# Patient Record
Sex: FEMALE | Race: WHITE | NOT HISPANIC OR LATINO | Employment: UNEMPLOYED | URBAN - METROPOLITAN AREA
[De-identification: names, ages, dates, MRNs, and addresses within clinical notes are randomized per-mention and may not be internally consistent; named-entity substitution may affect disease eponyms.]

---

## 2017-10-06 ENCOUNTER — APPOINTMENT (OUTPATIENT)
Dept: WOMENS IMAGING | Facility: HOSPITAL | Age: 47
End: 2017-10-06

## 2017-10-06 PROCEDURE — 76641 ULTRASOUND BREAST COMPLETE: CPT | Performed by: RADIOLOGY

## 2017-10-06 PROCEDURE — 77066 DX MAMMO INCL CAD BI: CPT | Performed by: RADIOLOGY

## 2017-10-06 PROCEDURE — 77062 BREAST TOMOSYNTHESIS BI: CPT | Performed by: RADIOLOGY

## 2017-10-18 ENCOUNTER — APPOINTMENT (OUTPATIENT)
Dept: WOMENS IMAGING | Facility: HOSPITAL | Age: 47
End: 2017-10-18

## 2017-10-18 PROCEDURE — 76641 ULTRASOUND BREAST COMPLETE: CPT | Performed by: RADIOLOGY

## 2017-10-18 PROCEDURE — 77061 BREAST TOMOSYNTHESIS UNI: CPT | Performed by: RADIOLOGY

## 2017-10-18 PROCEDURE — 77065 DX MAMMO INCL CAD UNI: CPT | Performed by: RADIOLOGY

## 2018-07-16 ENCOUNTER — LAB (OUTPATIENT)
Dept: LAB | Facility: HOSPITAL | Age: 48
End: 2018-07-16

## 2018-07-16 ENCOUNTER — OFFICE VISIT (OUTPATIENT)
Dept: CARDIOLOGY | Facility: CLINIC | Age: 48
End: 2018-07-16

## 2018-07-16 VITALS
WEIGHT: 110.2 LBS | BODY MASS INDEX: 21.64 KG/M2 | DIASTOLIC BLOOD PRESSURE: 83 MMHG | HEIGHT: 60 IN | SYSTOLIC BLOOD PRESSURE: 120 MMHG | HEART RATE: 80 BPM | OXYGEN SATURATION: 100 %

## 2018-07-16 DIAGNOSIS — Z00.00 HEALTHCARE MAINTENANCE: ICD-10-CM

## 2018-07-16 DIAGNOSIS — R53.81 MALAISE AND FATIGUE: ICD-10-CM

## 2018-07-16 DIAGNOSIS — R00.2 PALPITATIONS: ICD-10-CM

## 2018-07-16 DIAGNOSIS — R53.83 MALAISE AND FATIGUE: ICD-10-CM

## 2018-07-16 DIAGNOSIS — R07.2 PRECORDIAL PAIN: ICD-10-CM

## 2018-07-16 DIAGNOSIS — R06.02 SHORTNESS OF BREATH: ICD-10-CM

## 2018-07-16 DIAGNOSIS — R07.2 PRECORDIAL PAIN: Primary | ICD-10-CM

## 2018-07-16 PROBLEM — E04.9 GOITER: Status: ACTIVE | Noted: 2018-07-16

## 2018-07-16 PROBLEM — J45.909 ASTHMA: Status: ACTIVE | Noted: 2018-07-16

## 2018-07-16 PROBLEM — K44.9 HIATAL HERNIA: Status: ACTIVE | Noted: 2018-07-16

## 2018-07-16 PROCEDURE — 36415 COLL VENOUS BLD VENIPUNCTURE: CPT

## 2018-07-16 PROCEDURE — 84443 ASSAY THYROID STIM HORMONE: CPT | Performed by: NURSE PRACTITIONER

## 2018-07-16 PROCEDURE — 85379 FIBRIN DEGRADATION QUANT: CPT | Performed by: NURSE PRACTITIONER

## 2018-07-16 PROCEDURE — 84484 ASSAY OF TROPONIN QUANT: CPT | Performed by: NURSE PRACTITIONER

## 2018-07-16 PROCEDURE — 82553 CREATINE MB FRACTION: CPT | Performed by: NURSE PRACTITIONER

## 2018-07-16 PROCEDURE — 80061 LIPID PANEL: CPT | Performed by: NURSE PRACTITIONER

## 2018-07-16 PROCEDURE — 85025 COMPLETE CBC W/AUTO DIFF WBC: CPT | Performed by: NURSE PRACTITIONER

## 2018-07-16 PROCEDURE — 80053 COMPREHEN METABOLIC PANEL: CPT | Performed by: NURSE PRACTITIONER

## 2018-07-16 PROCEDURE — 99204 OFFICE O/P NEW MOD 45 MIN: CPT | Performed by: NURSE PRACTITIONER

## 2018-07-16 RX ORDER — NITROGLYCERIN 0.4 MG/1
TABLET SUBLINGUAL
Qty: 30 TABLET | Refills: 3 | Status: SHIPPED | OUTPATIENT
Start: 2018-07-16 | End: 2021-03-24 | Stop reason: SDUPTHER

## 2018-07-16 RX ORDER — RANITIDINE 150 MG/1
150 TABLET ORAL 2 TIMES DAILY
COMMUNITY

## 2018-07-16 NOTE — PROGRESS NOTES
Subjective   Lisa Preciado is a 47 y.o. female     Chief Complaint   Patient presents with   • Establish Care     Presents to establish care.   • Shortness of Breath   • Palpitations       HPI    Problem List:     1.  Chest pain  2.  Palpitations  3.  Shortness of breath  4.  Asthma  5.  Hiatal hernia  6.  Goiter    Patient is a 47-year-old female who presents today to establish care for chest pain.  Patient says for a while now she's been having what feels like midsternum tightness that will radiate up into her neck and left elbow.  She says that she mostly notices around when she eats and she has every couple days.  She says it lasts for a couple hours.  She says for some reason he just feels like she's been holding her breath for a few minutes.  She does have a lot of burping with it.  She will get short of breath, nauseated, she has vomited, she will get lightheaded and fatigued when this occurs.  She says that she will have better 5, skipping and racing when she lies down and she's been having a lot more since she quit smoking.  Patient says she quit back in May.  She did complain of one episode of syncope when she felt like her throat was closing off but denies any dizziness or presyncope.  She denies any orthopnea, PND or edema.  She says that she will get short of breath at rest and again with her chest pain.  She says her fatigue comes and goes at any time.  She has been referred to ENT and pulmonary by her PCP but had missed his appointments due to her daughter having her grandchild.  She has not had a chance to reschedule this.  She used to see Dr. Harding for pulmonary.  She has been taking a lot of Tums but it does not help.    Occupation: Unemployed  She quit smoking May 13 smoked a pack a day for 15 years; denies alcohol and illicit drugs  Drinks 4 cups of coffee per day; one Pepsi per day; nebulized tea    Mom 64 -MI, questionable COPD  Dad 71 alive-healthy  Brother 47 alive-A. Fib  Sister 49  "alive-permanent pacemaker  Paternal grandfather 71 -heart dropsy   Patient possibly had rheumatic fever as a child    Current Outpatient Prescriptions   Medication Sig Dispense Refill   • raNITIdine (ZANTAC) 150 MG tablet Take 150 mg by mouth 2 (Two) Times a Day.     • aspirin 81 MG tablet Take 1 tablet by mouth Daily. 30 tablet 11   • nitroglycerin (NITROSTAT) 0.4 MG SL tablet 1 under the tongue as needed for angina, may repeat q5mins for up three doses 30 tablet 3     No current facility-administered medications for this visit.        ALLERGIES    Codeine; Dilaudid [hydromorphone hcl]; Toradol [ketorolac tromethamine]; and Ultram [tramadol]    Past Medical History:   Diagnosis Date   • GERD (gastroesophageal reflux disease)    • Polycystic kidney disease     Only the Right Kidney       Social History     Social History   • Marital status: Unknown     Spouse name: N/A   • Number of children: N/A   • Years of education: N/A     Occupational History   • Not on file.     Social History Main Topics   • Smoking status: Former Smoker     Packs/day: 1.00     Types: Cigarettes     Quit date: 2018   • Smokeless tobacco: Never Used   • Alcohol use No   • Drug use: No   • Sexual activity: Defer     Other Topics Concern   • Not on file     Social History Narrative   • No narrative on file       Family History   Problem Relation Age of Onset   • Heart attack Mother    • Heart disease Mother    • No Known Problems Father        Review of Systems   Constitutional: Positive for diaphoresis (\"when throat closes\", with CP ) and fatigue (\"comes and goes\"). Negative for chills and fever.   HENT: Positive for postnasal drip, rhinorrhea, sneezing and trouble swallowing (\"feels like throat will close off, choke on pills easily\" Does not see Gastroenterology. ).    Eyes: Negative for visual disturbance.   Respiratory: Positive for shortness of breath (At rest; with CP ).    Cardiovascular: Positive for chest pain (sometimes " "feels like tightening midsternum when sitting still rad to left arm to elbow ;  mostly around when she eats; maybe every couple days or so; lasts couple of hours; feels like holding her breath for a few min; a lot of burping  ) and palpitations (when lying down; racing, butterfly, skipping, since she quit smoking a lot ). Negative for leg swelling.   Gastrointestinal: Positive for nausea (with CP ) and vomiting (with CP ). Negative for abdominal pain, blood in stool, constipation and diarrhea.   Endocrine: Negative.    Genitourinary: Positive for hematuria (History of Kidney Disease). Negative for difficulty urinating.   Musculoskeletal: Negative for arthralgias, back pain, myalgias and neck pain.   Skin: Negative.    Allergic/Immunologic: Positive for environmental allergies.   Neurological: Positive for syncope (One episode- \"felt like my throat was closing off\"), light-headedness (with CP ) and headaches (For the past week on the left side). Negative for dizziness and weakness.   Hematological: Bruises/bleeds easily (Bruise).   Psychiatric/Behavioral: Negative for sleep disturbance (Denies waking at night SOA. ).       Objective   /83 (BP Location: Left arm, Patient Position: Sitting)   Pulse 80   Ht 152.4 cm (60\")   Wt 50 kg (110 lb 3.2 oz)   SpO2 100%   BMI 21.52 kg/m²   Vitals:    07/16/18 1400   BP: 120/83   BP Location: Left arm   Patient Position: Sitting   Pulse: 80   SpO2: 100%   Weight: 50 kg (110 lb 3.2 oz)   Height: 152.4 cm (60\")      Lab Results (most recent)     None        Physical Exam   Constitutional: She is oriented to person, place, and time. Vital signs are normal. She appears well-developed and well-nourished. She is active and cooperative.   HENT:   Head: Normocephalic.   Eyes: Lids are normal.   Neck: Normal carotid pulses, no hepatojugular reflux and no JVD present. Carotid bruit is not present.   Cardiovascular: Normal rate, regular rhythm and normal heart sounds.    Pulses:   "     Radial pulses are 2+ on the right side, and 2+ on the left side.        Dorsalis pedis pulses are 2+ on the right side, and 2+ on the left side.        Posterior tibial pulses are 2+ on the right side, and 2+ on the left side.   No edema BLE.    Pulmonary/Chest: Effort normal and breath sounds normal.   Abdominal: Normal appearance and bowel sounds are normal.   Neurological: She is alert and oriented to person, place, and time.   Skin: Skin is warm, dry and intact.   Psychiatric: She has a normal mood and affect. Her speech is normal and behavior is normal. Judgment and thought content normal. Cognition and memory are normal.       Procedure   Procedures         Assessment/Plan      Diagnosis Plan   1. Precordial pain  Stress Test With Myocardial Perfusion One Day    Adult Transthoracic Echo Complete W/ Cont if Necessary Per Protocol    nitroglycerin (NITROSTAT) 0.4 MG SL tablet    aspirin 81 MG tablet    Comprehensive Metabolic Panel    Troponin    CK-MB    Lipid Panel    D-dimer, Quantitative   2. Shortness of breath  Stress Test With Myocardial Perfusion One Day    Adult Transthoracic Echo Complete W/ Cont if Necessary Per Protocol    Troponin    CK-MB    D-dimer, Quantitative   3. Palpitations  Stress Test With Myocardial Perfusion One Day    Adult Transthoracic Echo Complete W/ Cont if Necessary Per Protocol    Cardiac Event Monitor    TSH   4. Healthcare maintenance  CBC & Differential    Comprehensive Metabolic Panel    TSH    Lipid Panel   5. Malaise and fatigue  CBC & Differential    Comprehensive Metabolic Panel    TSH       Return in about 8 weeks (around 9/10/2018).      chest pain/shortness of breath/palpitations/fatigue-patient will have ischemia workup, stress and echo.  She will wear an event monitor for 2 weeks.  She will get a CK-MB, troponin, d-dimer, CMP, TSH, CBC and lipids.  She will use nitroglycerin when necessary for chest pain no resolution she will go to the ER.  She will start  aspirin 81 mg.  She will continue her medication regimen otherwise.  She'll follow-up in 8 weeks or sooner if any changes.          Patient's Body mass index is 21.52 kg/m². BMI is within normal parameters. No follow-up required.      Electronically signed by:

## 2018-07-16 NOTE — PATIENT INSTRUCTIONS
Palpitations  A palpitation is the feeling that your heartbeat is irregular or is faster than normal. It may feel like your heart is fluttering or skipping a beat. Palpitations are usually not a serious problem. They may be caused by many things, including smoking, caffeine, alcohol, stress, and certain medicines. Although most causes of palpitations are not serious, palpitations can be a sign of a serious medical problem. In some cases, you may need further medical evaluation.  Follow these instructions at home:  Pay attention to any changes in your symptoms. Take these actions to help with your condition:  · Avoid the following:  ? Caffeinated coffee, tea, soft drinks, diet pills, and energy drinks.  ? Chocolate.  ? Alcohol.  · Do not use any tobacco products, such as cigarettes, chewing tobacco, and e-cigarettes. If you need help quitting, ask your health care provider.  · Try to reduce your stress and anxiety. Things that can help you relax include:  ? Yoga.  ? Meditation.  ? Physical activity, such as swimming, jogging, or walking.  ? Biofeedback. This is a method that helps you learn to use your mind to control things in your body, such as your heartbeats.  · Get plenty of rest and sleep.  · Take over-the-counter and prescription medicines only as told by your health care provider.  · Keep all follow-up visits as told by your health care provider. This is important.    Contact a health care provider if:  · You continue to have a fast or irregular heartbeat after 24 hours.  · Your palpitations occur more often.  Get help right away if:  · You have chest pain or shortness of breath.  · You have a severe headache.  · You feel dizzy or you faint.  This information is not intended to replace advice given to you by your health care provider. Make sure you discuss any questions you have with your health care provider.  Document Released: 12/15/2001 Document Revised: 05/22/2017 Document Reviewed: 09/01/2016  [employer]  Interactive Patient Education © 2018 [employer]    Nonspecific Chest Pain  Chest pain can be caused by many different conditions. There is always a chance that your pain could be related to something serious, such as a heart attack or a blood clot in your lungs. Chest pain can also be caused by conditions that are not life-threatening. If you have chest pain, it is very important to follow up with your health care provider.  What are the causes?  Causes of this condition include:  · Heartburn.  · Pneumonia or bronchitis.  · Anxiety or stress.  · Inflammation around your heart (pericarditis) or lung (pleuritis or pleurisy).  · A blood clot in your lung.  · A collapsed lung (pneumothorax). This can develop suddenly on its own (spontaneous pneumothorax) or from trauma to the chest.  · Shingles infection (varicella-zoster virus).  · Heart attack.  · Damage to the bones, muscles, and cartilage that make up your chest wall. This can include:  ? Bruised bones due to injury.  ? Strained muscles or cartilage due to frequent or repeated coughing or overwork.  ? Fracture to one or more ribs.  ? Sore cartilage due to inflammation (costochondritis).    What increases the risk?  Risk factors for this condition may include:  · Activities that increase your risk for trauma or injury to your chest.  · Respiratory infections or conditions that cause frequent coughing.  · Medical conditions or overeating that can cause heartburn.  · Heart disease or family history of heart disease.  · Conditions or health behaviors that increase your risk of developing a blood clot.  · Having had chicken pox (varicella zoster).    What are the signs or symptoms?  Chest pain can feel like:  · Burning or tingling on the surface of your chest or deep in your chest.  · Crushing, pressure, aching, or squeezing pain.  · Dull or sharp pain that is worse when you move, cough, or take a deep breath.  · Pain that is also felt in your back, neck, shoulder, or  arm, or pain that spreads to any of these areas.    Your chest pain may come and go, or it may stay constant.  How is this diagnosed?  Lab tests or other studies may be needed to find the cause of your pain. Your health care provider may have you take a test called an ECG (electrocardiogram). An ECG records your heartbeat patterns at the time the test is performed. You may also have other tests, such as:  · Transthoracic echocardiogram (TTE). In this test, sound waves are used to create a picture of the heart structures and to look at how blood flows through your heart.  · Transesophageal echocardiogram (SHERLEY). This is a more advanced imaging test that takes images from inside your body. It allows your health care provider to see your heart in finer detail.  · Cardiac monitoring. This allows your health care provider to monitor your heart rate and rhythm in real time.  · Holter monitor. This is a portable device that records your heartbeat and can help to diagnose abnormal heartbeats. It allows your health care provider to track your heart activity for several days, if needed.  · Stress tests. These can be done through exercise or by taking medicine that makes your heart beat more quickly.  · Blood tests.  · Other imaging tests.    How is this treated?  Treatment depends on what is causing your chest pain. Treatment may include:  · Medicines. These may include:  ? Acid blockers for heartburn.  ? Anti-inflammatory medicine.  ? Pain medicine for inflammatory conditions.  ? Antibiotic medicine, if an infection is present.  ? Medicines to dissolve blood clots.  ? Medicines to treat coronary artery disease (CAD).  · Supportive care for conditions that do not require medicines. This may include:  ? Resting.  ? Applying heat or cold packs to injured areas.  ? Limiting activities until pain decreases.    Follow these instructions at home:  Medicines  · If you were prescribed an antibiotic, take it as told by your health care  provider. Do not stop taking the antibiotic even if you start to feel better.  · Take over-the-counter and prescription medicines only as told by your health care provider.  Lifestyle  · Do not use any products that contain nicotine or tobacco, such as cigarettes and e-cigarettes. If you need help quitting, ask your health care provider.  · Do not drink alcohol.  · Make lifestyle changes as directed by your health care provider. These may include:  ? Getting regular exercise. Ask your health care provider to suggest some activities that are safe for you.  ? Eating a heart-healthy diet. A registered dietitian can help you to learn healthy eating options.  ? Maintaining a healthy weight.  ? Managing diabetes, if necessary.  ? Reducing stress, such as with yoga or relaxation techniques.  General instructions  · Avoid any activities that bring on chest pain.  · If heartburn is the cause for your chest pain, raise (elevate) the head of your bed about 6 inches (15 cm) by putting blocks under the legs. Sleeping with more pillows does not effectively relieve heartburn because it only changes the position of your head.  · Keep all follow-up visits as told by your health care provider. This is important. This includes any further testing if your chest pain does not go away.  Contact a health care provider if:  · Your chest pain does not go away.  · You have a rash with blisters on your chest.  · You have a fever.  · You have chills.  Get help right away if:  · Your chest pain is worse.  · You have a cough that gets worse, or you cough up blood.  · You have severe pain in your abdomen.  · You have severe weakness.  · You faint.  · You have sudden, unexplained chest discomfort.  · You have sudden, unexplained discomfort in your arms, back, neck, or jaw.  · You have shortness of breath at any time.  · You suddenly start to sweat, or your skin gets clammy.  · You feel nauseous or you vomit.  · You suddenly feel light-headed or  dizzy.  · Your heart begins to beat quickly, or it feels like it is skipping beats.  These symptoms may represent a serious problem that is an emergency. Do not wait to see if the symptoms will go away. Get medical help right away. Call your local emergency services (911 in the [place] Do not drive yourself to the hospital.  This information is not intended to replace advice given to you by your health care provider. Make sure you discuss any questions you have with your health care provider.  Document Released: 09/27/2006 Document Revised: 09/11/2017 Document Reviewed: 09/11/2017  [employer] Interactive Patient Education © 2017 [employer]

## 2018-07-17 LAB
ALBUMIN SERPL-MCNC: 4.6 G/DL (ref 3.5–5)
ALBUMIN/GLOB SERPL: 1.4 G/DL (ref 1.5–2.5)
ALP SERPL-CCNC: 62 U/L (ref 35–104)
ALT SERPL W P-5'-P-CCNC: 19 U/L (ref 10–36)
ANION GAP SERPL CALCULATED.3IONS-SCNC: 8.7 MMOL/L (ref 3.6–11.2)
AST SERPL-CCNC: 27 U/L (ref 10–30)
BASOPHILS # BLD AUTO: 0.05 10*3/MM3 (ref 0–0.3)
BASOPHILS NFR BLD AUTO: 0.8 % (ref 0–2)
BILIRUB SERPL-MCNC: 0.7 MG/DL (ref 0.2–1.8)
BUN BLD-MCNC: 12 MG/DL (ref 7–21)
BUN/CREAT SERPL: 15.2 (ref 7–25)
CALCIUM SPEC-SCNC: 9.3 MG/DL (ref 7.7–10)
CHLORIDE SERPL-SCNC: 109 MMOL/L (ref 99–112)
CHOLEST SERPL-MCNC: 173 MG/DL (ref 0–200)
CK MB SERPL-CCNC: 0.61 NG/ML (ref 0–5)
CO2 SERPL-SCNC: 23.3 MMOL/L (ref 24.3–31.9)
CREAT BLD-MCNC: 0.79 MG/DL (ref 0.43–1.29)
D DIMER PPP FEU-MCNC: <0.27 MCGFEU/ML (ref 0–0.5)
DEPRECATED RDW RBC AUTO: 44.9 FL (ref 37–54)
EOSINOPHIL # BLD AUTO: 0.21 10*3/MM3 (ref 0–0.7)
EOSINOPHIL NFR BLD AUTO: 3.3 % (ref 0–5)
ERYTHROCYTE [DISTWIDTH] IN BLOOD BY AUTOMATED COUNT: 13.5 % (ref 11.5–14.5)
GFR SERPL CREATININE-BSD FRML MDRD: 79 ML/MIN/1.73
GLOBULIN UR ELPH-MCNC: 3.3 GM/DL
GLUCOSE BLD-MCNC: 94 MG/DL (ref 70–110)
HCT VFR BLD AUTO: 41.6 % (ref 37–47)
HDLC SERPL-MCNC: 78 MG/DL (ref 60–100)
HGB BLD-MCNC: 14.3 G/DL (ref 12–16)
IMM GRANULOCYTES # BLD: 0.06 10*3/MM3 (ref 0–0.03)
IMM GRANULOCYTES NFR BLD: 1 % (ref 0–0.5)
LDLC SERPL CALC-MCNC: 85 MG/DL (ref 0–100)
LDLC/HDLC SERPL: 1.09 {RATIO}
LYMPHOCYTES # BLD AUTO: 2.48 10*3/MM3 (ref 1–3)
LYMPHOCYTES NFR BLD AUTO: 39.3 % (ref 21–51)
MCH RBC QN AUTO: 32.5 PG (ref 27–33)
MCHC RBC AUTO-ENTMCNC: 34.4 G/DL (ref 33–37)
MCV RBC AUTO: 94.5 FL (ref 80–94)
MONOCYTES # BLD AUTO: 0.48 10*3/MM3 (ref 0.1–0.9)
MONOCYTES NFR BLD AUTO: 7.6 % (ref 0–10)
NEUTROPHILS # BLD AUTO: 3.03 10*3/MM3 (ref 1.4–6.5)
NEUTROPHILS NFR BLD AUTO: 48 % (ref 30–70)
OSMOLALITY SERPL CALC.SUM OF ELEC: 280.8 MOSM/KG (ref 273–305)
PLATELET # BLD AUTO: 225 10*3/MM3 (ref 130–400)
PMV BLD AUTO: 11.1 FL (ref 6–10)
POTASSIUM BLD-SCNC: 4.1 MMOL/L (ref 3.5–5.3)
PROT SERPL-MCNC: 7.9 G/DL (ref 6–8)
RBC # BLD AUTO: 4.4 10*6/MM3 (ref 4.2–5.4)
SODIUM BLD-SCNC: 141 MMOL/L (ref 135–153)
TRIGL SERPL-MCNC: 50 MG/DL (ref 0–150)
TROPONIN I SERPL-MCNC: <0.006 NG/ML
TSH SERPL DL<=0.05 MIU/L-ACNC: 2.22 MIU/ML (ref 0.55–4.78)
VLDLC SERPL-MCNC: 10 MG/DL
WBC NRBC COR # BLD: 6.31 10*3/MM3 (ref 4.5–12.5)

## 2018-08-08 ENCOUNTER — OUTSIDE FACILITY SERVICE (OUTPATIENT)
Dept: CARDIOLOGY | Facility: CLINIC | Age: 48
End: 2018-08-08

## 2018-08-08 ENCOUNTER — HOSPITAL ENCOUNTER (OUTPATIENT)
Dept: CARDIOLOGY | Facility: HOSPITAL | Age: 48
Discharge: HOME | End: 2018-08-08

## 2018-08-08 DIAGNOSIS — R07.2 PRECORDIAL PAIN: ICD-10-CM

## 2018-08-08 DIAGNOSIS — R00.2 PALPITATIONS: ICD-10-CM

## 2018-08-08 DIAGNOSIS — R06.02 SHORTNESS OF BREATH: ICD-10-CM

## 2018-08-08 LAB
MAXIMAL PREDICTED HEART RATE: 175 BPM
MAXIMAL PREDICTED HEART RATE: 175 BPM
STRESS TARGET HR: 149 BPM
STRESS TARGET HR: 149 BPM

## 2018-08-08 PROCEDURE — 93306 TTE W/DOPPLER COMPLETE: CPT | Performed by: INTERNAL MEDICINE

## 2018-08-08 PROCEDURE — 25010000002 REGADENOSON 0.4 MG/5ML SOLUTION: Performed by: INTERNAL MEDICINE

## 2018-08-08 PROCEDURE — 93017 CV STRESS TEST TRACING ONLY: CPT

## 2018-08-08 PROCEDURE — 78452 HT MUSCLE IMAGE SPECT MULT: CPT | Performed by: INTERNAL MEDICINE

## 2018-08-08 PROCEDURE — 78452 HT MUSCLE IMAGE SPECT MULT: CPT

## 2018-08-08 PROCEDURE — 0 TECHNETIUM SESTAMIBI: Performed by: INTERNAL MEDICINE

## 2018-08-08 PROCEDURE — A9500 TC99M SESTAMIBI: HCPCS | Performed by: INTERNAL MEDICINE

## 2018-08-08 PROCEDURE — 93018 CV STRESS TEST I&R ONLY: CPT | Performed by: INTERNAL MEDICINE

## 2018-08-08 PROCEDURE — 93306 TTE W/DOPPLER COMPLETE: CPT

## 2018-08-08 RX ADMIN — TECHNETIUM TC 99M SESTAMIBI 1 DOSE: 1 INJECTION INTRAVENOUS at 07:57

## 2018-08-08 RX ADMIN — REGADENOSON 0.4 MG: 0.08 INJECTION, SOLUTION INTRAVENOUS at 07:56

## 2018-08-08 RX ADMIN — TECHNETIUM TC 99M SESTAMIBI 1 DOSE: 1 INJECTION INTRAVENOUS at 07:56

## 2018-08-09 ENCOUNTER — TELEPHONE (OUTPATIENT)
Dept: CARDIOLOGY | Facility: CLINIC | Age: 48
End: 2018-08-09

## 2018-08-13 ENCOUNTER — LAB (OUTPATIENT)
Dept: LAB | Facility: HOSPITAL | Age: 48
End: 2018-08-13

## 2018-08-13 ENCOUNTER — OFFICE VISIT (OUTPATIENT)
Dept: CARDIOLOGY | Facility: CLINIC | Age: 48
End: 2018-08-13

## 2018-08-13 VITALS
HEIGHT: 60 IN | BODY MASS INDEX: 22.07 KG/M2 | OXYGEN SATURATION: 100 % | DIASTOLIC BLOOD PRESSURE: 75 MMHG | HEART RATE: 66 BPM | SYSTOLIC BLOOD PRESSURE: 132 MMHG | WEIGHT: 112.4 LBS

## 2018-08-13 DIAGNOSIS — R94.39 ABNORMAL STRESS TEST: ICD-10-CM

## 2018-08-13 DIAGNOSIS — R00.2 PALPITATIONS: ICD-10-CM

## 2018-08-13 DIAGNOSIS — R06.02 SHORTNESS OF BREATH: ICD-10-CM

## 2018-08-13 DIAGNOSIS — R07.2 PRECORDIAL PAIN: Primary | ICD-10-CM

## 2018-08-13 DIAGNOSIS — Z00.00 HEALTHCARE MAINTENANCE: ICD-10-CM

## 2018-08-13 DIAGNOSIS — R07.2 PRECORDIAL PAIN: ICD-10-CM

## 2018-08-13 PROCEDURE — 36415 COLL VENOUS BLD VENIPUNCTURE: CPT

## 2018-08-13 PROCEDURE — 80048 BASIC METABOLIC PNL TOTAL CA: CPT | Performed by: NURSE PRACTITIONER

## 2018-08-13 PROCEDURE — 99214 OFFICE O/P EST MOD 30 MIN: CPT | Performed by: NURSE PRACTITIONER

## 2018-08-13 RX ORDER — ATORVASTATIN CALCIUM 20 MG/1
20 TABLET, FILM COATED ORAL DAILY
Qty: 30 TABLET | Refills: 11 | Status: SHIPPED | OUTPATIENT
Start: 2018-08-13

## 2018-08-13 RX ORDER — CLOPIDOGREL BISULFATE 75 MG/1
75 TABLET ORAL DAILY
Qty: 30 TABLET | Refills: 11 | Status: SHIPPED | OUTPATIENT
Start: 2018-08-13

## 2018-08-13 RX ORDER — ISOSORBIDE MONONITRATE 30 MG/1
TABLET, EXTENDED RELEASE ORAL
Qty: 30 TABLET | Refills: 11 | Status: SHIPPED | OUTPATIENT
Start: 2018-08-13 | End: 2021-03-24 | Stop reason: SDUPTHER

## 2018-08-13 RX ORDER — ISOSORBIDE MONONITRATE 30 MG/1
30 TABLET, EXTENDED RELEASE ORAL EVERY MORNING
Qty: 30 TABLET | Refills: 11 | Status: SHIPPED | OUTPATIENT
Start: 2018-08-13 | End: 2018-08-13 | Stop reason: SDUPTHER

## 2018-08-13 NOTE — PROGRESS NOTES
Subjective   Lisa Preciado is a 47 y.o. female     Chief Complaint   Patient presents with   • Palpitations     PRESENTS AS A FOLLOW UP       HPI    Problem List:     1.  Chest pain  1.1 stress test 8/8/18-anterior, anteroseptal and septal ischemia, preserved LVEF, mild hypokinesis and moderate hypokinesis  2.  Palpitations  3.  Shortness of breath  3.1 Echo 8/8/18-EF 55-60%, diastolic dysfunction 1, trace TR, trace AZ  4.  Asthma  5.  Hiatal hernia  6.  Goiter    Patient is a 47-year-old female who presents today with her significant other for follow-up on testing.  He continues to have left anterior pressure/squeezing which she describes as a elephant.  She says she will then have sharp pain under her left arm.  She says that she will have it mostly at rest.  She says that she has taken up to 3 nitroglycerin during an episode.  She says she has what feels like heartburn that goes up into her throat and down into her chest however and asses.work only nitroglycerin relieves the pain.  He will get diaphoretic, fatigued, nauseated, lightheaded and short of breath when this occurs.  She said after 8/7 she hadn't had it for a while until about 3 days ago and then it came back.  She says at times she will have palpitations with her chest pain and other times it without it.  She says sometimes it feels like is beating really fast and it feels like it stops that she will get real sweaty with this.  She says she constantly feels like she is dizzy she says it doesn't feel like it ever goes away, like she's taking the Benadryl and you have the after effects.  She denies any presyncope, syncope, orthopnea, PND or edema.  She says when she gets going she is fine she don't have any problems breathing except when she first starts out her day.    We went over her stress, echo and labs.    Current Outpatient Prescriptions   Medication Sig Dispense Refill   • aspirin 81 MG tablet Take 1 tablet by mouth Daily. 30 tablet 11   •  nitroglycerin (NITROSTAT) 0.4 MG SL tablet 1 under the tongue as needed for angina, may repeat q5mins for up three doses 30 tablet 3   • raNITIdine (ZANTAC) 150 MG tablet Take 150 mg by mouth 2 (Two) Times a Day.     • atorvastatin (LIPITOR) 20 MG tablet Take 1 tablet by mouth Daily. 30 tablet 11   • clopidogrel (PLAVIX) 75 MG tablet Take 1 tablet by mouth Daily. 30 tablet 11   • isosorbide mononitrate (IMDUR) 30 MG 24 hr tablet TAKE 1/2 TABLET BY MOUTH DAILY 30 tablet 11     No current facility-administered medications for this visit.        ALLERGIES    Bactrim [sulfamethoxazole-trimethoprim]; Codeine; Dilaudid [hydromorphone hcl]; Toradol [ketorolac tromethamine]; and Ultram [tramadol]    Past Medical History:   Diagnosis Date   • GERD (gastroesophageal reflux disease)    • Polycystic kidney disease     Only the Right Kidney       Social History     Social History   • Marital status: Unknown     Spouse name: N/A   • Number of children: N/A   • Years of education: N/A     Occupational History   • Not on file.     Social History Main Topics   • Smoking status: Former Smoker     Packs/day: 1.00     Types: Cigarettes     Quit date: 5/13/2018   • Smokeless tobacco: Never Used   • Alcohol use No   • Drug use: No   • Sexual activity: Defer     Other Topics Concern   • Not on file     Social History Narrative   • No narrative on file       Family History   Problem Relation Age of Onset   • Heart attack Mother    • Heart disease Mother    • No Known Problems Father        Review of Systems   Constitutional: Positive for diaphoresis (WITH CP ) and fatigue (AFTER CP EPISODES MORE THAN WHAT SHE IS ALREADY ).   HENT: Positive for sneezing. Negative for rhinorrhea.    Eyes: Negative for visual disturbance.   Respiratory: Positive for cough (JUST STARTED WHEN SHE GOT INTO OFFICE ) and shortness of breath (WITH CP ).    Cardiovascular: Positive for chest pain (LEFT ANTERIOR PRESSURE/SQUEEZING (ELEPHANT), SHARP PAIN UNDER LEFT  "ARM AT TIMES; 8/7 AND THEN STARTED AGAIN COUPLE DAYS AGO;  MOSTLY WITH REST; TAKEN UP TO 3 NITRO ) and palpitations (WITH AND WITHOUT CP LIKE A ROCKING SENSATION AND SOMETIMS FAST AND THEN FEELS LIKE IT STOPS, WILL SWEAT WITH THIS ). Negative for leg swelling.   Gastrointestinal: Positive for nausea (WITH CP ). Negative for constipation, diarrhea and vomiting.   Endocrine: Negative.    Genitourinary: Negative for difficulty urinating.   Musculoskeletal: Positive for neck pain (BURNS FROM NECK TO BETWEEN BREAST WITH CP (HEARTBURN FEELING) NOTHING HELPS BUT NITRO ). Negative for arthralgias, back pain and myalgias.   Skin: Negative.    Allergic/Immunologic: Positive for food allergies (DAIRY). Negative for environmental allergies.   Neurological: Positive for dizziness (FEELS LIKE SHE STAYS DIZZY ALL OF THE TIME NEVER GOES AWAY, LIKE SHE TOOK MEDICATION ) and light-headedness (WITH CP). Negative for syncope.   Hematological: Bruises/bleeds easily.   Psychiatric/Behavioral: The patient is nervous/anxious (AT TIMES LATELY ).        Objective   /75 (BP Location: Left arm, Patient Position: Sitting)   Pulse 66   Ht 152.4 cm (60\")   Wt 51 kg (112 lb 6.4 oz)   SpO2 100%   BMI 21.95 kg/m²   Vitals:    08/13/18 1105   BP: 132/75   BP Location: Left arm   Patient Position: Sitting   Pulse: 66   SpO2: 100%   Weight: 51 kg (112 lb 6.4 oz)   Height: 152.4 cm (60\")      Lab Results (most recent)     None        Physical Exam   Constitutional: She is oriented to person, place, and time. Vital signs are normal. She appears well-developed and well-nourished. She is active and cooperative.   HENT:   Head: Normocephalic.   Eyes: Lids are normal.   Neck: Normal carotid pulses, no hepatojugular reflux and no JVD present. Carotid bruit is not present.   Cardiovascular: Normal rate, regular rhythm and normal heart sounds.    Pulses:       Radial pulses are 2+ on the right side, and 2+ on the left side.        Dorsalis pedis " pulses are 2+ on the right side, and 2+ on the left side.        Posterior tibial pulses are 2+ on the right side, and 2+ on the left side.   NO EDEMA BLE.    Pulmonary/Chest: Effort normal and breath sounds normal.   Abdominal: Normal appearance and bowel sounds are normal.   Neurological: She is alert and oriented to person, place, and time.   Skin: Skin is warm, dry and intact.   Psychiatric: She has a normal mood and affect. Her speech is normal and behavior is normal. Judgment and thought content normal. Cognition and memory are normal.       Procedure   Procedures         Assessment/Plan      Diagnosis Plan   1. Precordial pain  Cardiac catheterization    Basic Metabolic Panel    clopidogrel (PLAVIX) 75 MG tablet    isosorbide mononitrate (IMDUR) 30 MG 24 hr tablet   2. Shortness of breath  Cardiac catheterization   3. Palpitations  Cardiac catheterization   4. Abnormal stress test  Cardiac catheterization    atorvastatin (LIPITOR) 20 MG tablet   5. Healthcare maintenance  Basic Metabolic Panel       Return for After testing.    Chest pain/shortness of breath/palpitations/abnormal stress test-patient will have left heart catheter.  She will get a BMP.  She will start Plavix.  She will start at atorvastatin.  She will also start isosorbide half a tablet day.  She will continue her medication regimen otherwise.  She will follow-up after left heart catheter or sooner if any changes.  She will continue use nitroglycerin when necessary for chest pain no resolution she can go to the ER.           Patient's Body mass index is 21.95 kg/m². BMI is within normal parameters. No follow-up required.      Electronically signed by:

## 2018-08-14 LAB
ANION GAP SERPL CALCULATED.3IONS-SCNC: 7.2 MMOL/L (ref 3.6–11.2)
BUN BLD-MCNC: 18 MG/DL (ref 7–21)
BUN/CREAT SERPL: 17.3 (ref 7–25)
CALCIUM SPEC-SCNC: 9.5 MG/DL (ref 7.7–10)
CHLORIDE SERPL-SCNC: 109 MMOL/L (ref 99–112)
CO2 SERPL-SCNC: 27.8 MMOL/L (ref 24.3–31.9)
CREAT BLD-MCNC: 1.04 MG/DL (ref 0.43–1.29)
GFR SERPL CREATININE-BSD FRML MDRD: 57 ML/MIN/1.73
GLUCOSE BLD-MCNC: 103 MG/DL (ref 70–110)
OSMOLALITY SERPL CALC.SUM OF ELEC: 289 MOSM/KG (ref 273–305)
POTASSIUM BLD-SCNC: 4.2 MMOL/L (ref 3.5–5.3)
SODIUM BLD-SCNC: 144 MMOL/L (ref 135–153)

## 2018-08-30 ENCOUNTER — OUTSIDE FACILITY SERVICE (OUTPATIENT)
Dept: CARDIOLOGY | Facility: CLINIC | Age: 48
End: 2018-08-30

## 2018-08-30 PROCEDURE — 93458 L HRT ARTERY/VENTRICLE ANGIO: CPT | Performed by: INTERNAL MEDICINE

## 2019-07-25 ENCOUNTER — TELEPHONE (OUTPATIENT)
Dept: CARDIOLOGY | Facility: CLINIC | Age: 49
End: 2019-07-25

## 2020-02-18 ENCOUNTER — APPOINTMENT (OUTPATIENT)
Dept: WOMENS IMAGING | Facility: HOSPITAL | Age: 50
End: 2020-02-18

## 2020-02-18 PROCEDURE — 77063 BREAST TOMOSYNTHESIS BI: CPT | Performed by: RADIOLOGY

## 2020-02-18 PROCEDURE — 77067 SCR MAMMO BI INCL CAD: CPT | Performed by: RADIOLOGY

## 2021-03-24 DIAGNOSIS — R07.2 PRECORDIAL PAIN: ICD-10-CM

## 2021-03-24 RX ORDER — ISOSORBIDE MONONITRATE 30 MG/1
TABLET, EXTENDED RELEASE ORAL
Qty: 30 TABLET | Refills: 11 | Status: SHIPPED | OUTPATIENT
Start: 2021-03-24

## 2021-03-24 RX ORDER — NITROGLYCERIN 0.4 MG/1
TABLET SUBLINGUAL
Qty: 30 TABLET | Refills: 1 | Status: SHIPPED | OUTPATIENT
Start: 2021-03-24

## 2021-05-20 ENCOUNTER — APPOINTMENT (OUTPATIENT)
Dept: WOMENS IMAGING | Facility: HOSPITAL | Age: 51
End: 2021-05-20

## 2021-05-20 PROCEDURE — 77067 SCR MAMMO BI INCL CAD: CPT | Performed by: RADIOLOGY

## 2021-05-20 PROCEDURE — 77063 BREAST TOMOSYNTHESIS BI: CPT | Performed by: RADIOLOGY

## 2022-03-28 DIAGNOSIS — R07.2 PRECORDIAL PAIN: ICD-10-CM

## 2022-03-28 RX ORDER — ISOSORBIDE MONONITRATE 30 MG/1
TABLET, EXTENDED RELEASE ORAL
Qty: 30 TABLET | Refills: 11 | OUTPATIENT
Start: 2022-03-28

## 2024-10-30 ENCOUNTER — OFFICE VISIT (OUTPATIENT)
Dept: CARDIOLOGY | Facility: CLINIC | Age: 54
End: 2024-10-30
Payer: MEDICARE

## 2024-10-30 VITALS
OXYGEN SATURATION: 98 % | HEIGHT: 60 IN | HEART RATE: 66 BPM | WEIGHT: 151 LBS | BODY MASS INDEX: 29.64 KG/M2 | SYSTOLIC BLOOD PRESSURE: 141 MMHG | DIASTOLIC BLOOD PRESSURE: 103 MMHG

## 2024-10-30 DIAGNOSIS — R06.02 SHORTNESS OF BREATH: ICD-10-CM

## 2024-10-30 DIAGNOSIS — I10 PRIMARY HYPERTENSION: ICD-10-CM

## 2024-10-30 DIAGNOSIS — R07.2 PRECORDIAL PAIN: ICD-10-CM

## 2024-10-30 PROCEDURE — 1159F MED LIST DOCD IN RCRD: CPT | Performed by: PHYSICIAN ASSISTANT

## 2024-10-30 PROCEDURE — 99204 OFFICE O/P NEW MOD 45 MIN: CPT | Performed by: PHYSICIAN ASSISTANT

## 2024-10-30 PROCEDURE — 1160F RVW MEDS BY RX/DR IN RCRD: CPT | Performed by: PHYSICIAN ASSISTANT

## 2024-10-30 RX ORDER — IBUPROFEN 800 MG/1
800 TABLET, FILM COATED ORAL EVERY 8 HOURS PRN
COMMUNITY
Start: 2024-08-02

## 2024-10-30 RX ORDER — ISOSORBIDE MONONITRATE 30 MG/1
TABLET, EXTENDED RELEASE ORAL
Qty: 30 TABLET | Refills: 11 | Status: SHIPPED | OUTPATIENT
Start: 2024-10-30

## 2024-10-30 RX ORDER — NITROGLYCERIN 0.4 MG/1
TABLET SUBLINGUAL
Qty: 30 TABLET | Refills: 1 | Status: SHIPPED | OUTPATIENT
Start: 2024-10-30

## 2024-10-30 NOTE — PROGRESS NOTES
Subjective   Lisa Preciado is a 54 y.o. female     Chief Complaint   Patient presents with    [facility]     Cardiac eval - chest tightness     Palpitations    Shortness of Breath   Problem list:  1.  Coronary artery disease.  1.1.  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  By cath report, this appeared to pass anterior to the ascending aorta.  I do not see that a follow-up cardiac CTA to confirm that was ever performed.  1.2.  The patient had minor nonobstructive disease noted in the coronary arteries otherwise.  2.  Chronic chest pain.  3.  Hypertension  4.  Dyslipidemia.    HPI    The patient presents in the clinic today to reestablish cardiovascular care.  She was scheduled for catheterization 2018 because of abnormal noninvasive studies and low-level symptoms.  Cath findings are as outlined above.  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  She was treated empirically with isosorbide for some time, and did exceptionally well with basic resolution of chest pain.  For unknown reasons, she stopped that on her own in 2022.  She never followed up in the clinic.  She presents back today after reporting symptoms to her primary care provider.  She has had recurrence of chest pain basically since stopping isosorbide.  She would like to restart that now.  She describes precordial chest tightness with low levels of activity and stress.  She has no associated neck, arm, or jaw discomfort.  Baseline dyspnea is moderate.  She has no failure nor dysrhythmic symptoms.  She has no further complaints.    Current Outpatient Medications   Medication Sig Dispense Refill    ibuprofen (ADVIL,MOTRIN) 800 MG tablet Take 1 tablet by mouth Every 8 (Eight) Hours As Needed for Moderate Pain.      isosorbide mononitrate (IMDUR) 30 MG 24 hr tablet TAKE 1/2 TABLET BY MOUTH DAILY 30 tablet 11    nitroglycerin  (NITROSTAT) 0.4 MG SL tablet 1 under the tongue as needed for angina, may repeat q5mins for up three doses 30 tablet 1     No current facility-administered medications for this visit.       Bactrim [sulfamethoxazole-trimethoprim], Codeine, Dilaudid [hydromorphone hcl], Toradol [ketorolac tromethamine], and Ultram [tramadol]    Past Medical History:   Diagnosis Date    GERD (gastroesophageal reflux disease)     Polycystic kidney disease     Only the Right Kidney       Social History     Socioeconomic History    Marital status: Unknown   Tobacco Use    Smoking status: Former     Current packs/day: 0.00     Types: Cigarettes     Quit date: 2018     Years since quittin.4    Smokeless tobacco: Never   Substance and Sexual Activity    Alcohol use: No    Drug use: No    Sexual activity: Defer       Family History   Problem Relation Age of Onset    Heart attack Mother     Heart disease Mother     No Known Problems Father     Cancer Maternal Grandmother     Heart failure Maternal Grandfather        Review of Systems   Constitutional:  Positive for fatigue. Negative for chills, diaphoresis and fever.   Eyes: Negative.  Negative for visual disturbance.   Respiratory:  Positive for chest tightness and shortness of breath. Negative for apnea, cough and wheezing.    Cardiovascular:  Positive for palpitations and leg swelling. Negative for chest pain.   Gastrointestinal: Negative.  Negative for abdominal pain and blood in stool.   Endocrine: Negative.    Genitourinary: Negative.  Negative for hematuria.   Musculoskeletal: Negative.  Negative for arthralgias, back pain, myalgias, neck pain and neck stiffness.   Skin: Negative.  Negative for rash and wound.   Allergic/Immunologic: Positive for food allergies (dairy). Negative for environmental allergies.   Neurological:  Positive for dizziness and headaches. Negative for syncope, weakness, light-headedness and numbness.   Hematological:  Bruises/bleeds easily.  "  Psychiatric/Behavioral: Negative.  Negative for sleep disturbance.        Objective     Vitals:    10/30/24 1341 10/30/24 1356   BP: 134/93 (!) 141/103   Pulse: 71 66   SpO2: 98%    Weight: 68.5 kg (151 lb)    Height: 152.4 cm (60\")         BP (!) 141/103   Pulse 66   Ht 152.4 cm (60\")   Wt 68.5 kg (151 lb)   SpO2 98%   BMI 29.49 kg/m²      Lab Results (most recent)       None            Physical Exam  Vitals and nursing note reviewed.   Constitutional:       General: She is not in acute distress.     Appearance: She is well-developed.   HENT:      Head: Normocephalic and atraumatic.   Eyes:      Conjunctiva/sclera: Conjunctivae normal.      Pupils: Pupils are equal, round, and reactive to light.   Neck:      Vascular: No JVD.      Trachea: No tracheal deviation.   Cardiovascular:      Rate and Rhythm: Normal rate and regular rhythm.      Heart sounds: Normal heart sounds.   Pulmonary:      Effort: Pulmonary effort is normal.      Breath sounds: Normal breath sounds.   Abdominal:      General: Bowel sounds are normal. There is no distension.      Palpations: Abdomen is soft. There is no mass.      Tenderness: There is no abdominal tenderness. There is no guarding or rebound.   Musculoskeletal:         General: No tenderness or deformity. Normal range of motion.      Cervical back: Normal range of motion and neck supple.   Skin:     General: Skin is warm and dry.      Coloration: Skin is not pale.      Findings: No erythema or rash.   Neurological:      Mental Status: She is alert and oriented to person, place, and time.   Psychiatric:         Behavior: Behavior normal.         Thought Content: Thought content normal.         Judgment: Judgment normal.         Procedure   Procedures         Assessment & Plan      Diagnosis Plan   1. Precordial pain  isosorbide mononitrate (IMDUR) 30 MG 24 hr tablet    nitroglycerin (NITROSTAT) 0.4 MG SL tablet    Adult Transthoracic Echo Complete W/ Cont if Necessary Per " Protocol    Stress Test With Myocardial Perfusion One Day      2. Shortness of breath  Adult Transthoracic Echo Complete W/ Cont if Necessary Per Protocol    Stress Test With Myocardial Perfusion One Day      3. Primary hypertension  Adult Transthoracic Echo Complete W/ Cont if Necessary Per Protocol    Stress Test With Myocardial Perfusion One Day          1.  The patient presents back to reestablish cardiovascular care.  She presents because of hypertension and chest pain.  She would like to restart isosorbide as she had excellent treatment of chronic chest pain historically with the same.  She stopped that for unknown reasons in 2022.  We will restart at previous dose at 30 mg half a tab daily.    2.  I would then consider calcium channel blocker therapy if needed.  We would consider amlodipine 5 mg daily if blood pressures remain high.  This would empirically treat vasospastic angina and other issues at present.    3.  I will schedule for nuclear stress test and echocardiogram given chest pain and issues otherwise.    4.  Pending results of stress and echo and response to medications, we will see the patient back and recommend further.  Given previously described anomalous coronary artery, I would have low threshold to evaluate the RCA course and for potential impingement otherwise.  We will await results of stress and echo first.               Electronically signed by:

## 2024-11-18 ENCOUNTER — HOSPITAL ENCOUNTER (OUTPATIENT)
Dept: CARDIOLOGY | Facility: HOSPITAL | Age: 54
Discharge: HOME | End: 2024-11-18
Payer: MEDICARE

## 2024-11-18 VITALS — WEIGHT: 151.01 LBS | HEIGHT: 60 IN | BODY MASS INDEX: 29.65 KG/M2

## 2024-11-18 DIAGNOSIS — I10 PRIMARY HYPERTENSION: ICD-10-CM

## 2024-11-18 DIAGNOSIS — R07.2 PRECORDIAL PAIN: ICD-10-CM

## 2024-11-18 DIAGNOSIS — R06.02 SHORTNESS OF BREATH: ICD-10-CM

## 2024-11-18 LAB
AORTIC DIMENSIONLESS INDEX: 0.81 (DI)
BH CV ECHO MEAS - ACS: 1.58 CM
BH CV ECHO MEAS - AO MAX PG: 5.4 MMHG
BH CV ECHO MEAS - AO MEAN PG: 2.7 MMHG
BH CV ECHO MEAS - AO ROOT DIAM: 2.7 CM
BH CV ECHO MEAS - AO V2 MAX: 115.9 CM/SEC
BH CV ECHO MEAS - AO V2 VTI: 24.6 CM
BH CV ECHO MEAS - EDV(CUBED): 89.1 ML
BH CV ECHO MEAS - EDV(MOD-SP4): 55.5 ML
BH CV ECHO MEAS - EF(MOD-BP): 55 %
BH CV ECHO MEAS - EF(MOD-SP4): 55.5 %
BH CV ECHO MEAS - ESV(CUBED): 32.5 ML
BH CV ECHO MEAS - ESV(MOD-SP4): 24.7 ML
BH CV ECHO MEAS - FS: 28.6 %
BH CV ECHO MEAS - IVS/LVPW: 0.91 CM
BH CV ECHO MEAS - IVSD: 0.92 CM
BH CV ECHO MEAS - LA DIMENSION: 3.1 CM
BH CV ECHO MEAS - LAT PEAK E' VEL: 8 CM/SEC
BH CV ECHO MEAS - LV DIASTOLIC VOL/BSA (35-75): 33.5 CM2
BH CV ECHO MEAS - LV MASS(C)D: 143.6 GRAMS
BH CV ECHO MEAS - LV MAX PG: 3.9 MMHG
BH CV ECHO MEAS - LV MEAN PG: 1.68 MMHG
BH CV ECHO MEAS - LV SYSTOLIC VOL/BSA (12-30): 14.9 CM2
BH CV ECHO MEAS - LV V1 MAX: 98.2 CM/SEC
BH CV ECHO MEAS - LV V1 VTI: 20 CM
BH CV ECHO MEAS - LVIDD: 4.5 CM
BH CV ECHO MEAS - LVIDS: 3.2 CM
BH CV ECHO MEAS - LVPWD: 1.01 CM
BH CV ECHO MEAS - MED PEAK E' VEL: 5.7 CM/SEC
BH CV ECHO MEAS - MV A MAX VEL: 93 CM/SEC
BH CV ECHO MEAS - MV DEC SLOPE: 337.5 CM/SEC2
BH CV ECHO MEAS - MV DEC TIME: 0.19 SEC
BH CV ECHO MEAS - MV E MAX VEL: 88.7 CM/SEC
BH CV ECHO MEAS - MV E/A: 0.95
BH CV ECHO MEAS - MV MAX PG: 4.4 MMHG
BH CV ECHO MEAS - MV MEAN PG: 1.54 MMHG
BH CV ECHO MEAS - MV P1/2T: 91.4 MSEC
BH CV ECHO MEAS - MV V2 VTI: 38.8 CM
BH CV ECHO MEAS - MVA(P1/2T): 2.41 CM2
BH CV ECHO MEAS - PA V2 MAX: 78.2 CM/SEC
BH CV ECHO MEAS - RAP SYSTOLE: 3 MMHG
BH CV ECHO MEAS - RV MAX PG: 1.64 MMHG
BH CV ECHO MEAS - RV V1 MAX: 64 CM/SEC
BH CV ECHO MEAS - RV V1 VTI: 17.8 CM
BH CV ECHO MEAS - RVSP: 16.8 MMHG
BH CV ECHO MEAS - SV(MOD-SP4): 30.8 ML
BH CV ECHO MEAS - SVI(MOD-SP4): 18.6 ML/M2
BH CV ECHO MEAS - TAPSE (>1.6): 1.83 CM
BH CV ECHO MEAS - TR MAX PG: 13.8 MMHG
BH CV ECHO MEAS - TR MAX VEL: 185.5 CM/SEC
BH CV ECHO MEASUREMENTS AVERAGE E/E' RATIO: 12.95
BH CV XLRA - RV BASE: 2.7 CM
BH CV XLRA - RV LENGTH: 5.5 CM
BH CV XLRA - RV MID: 2.01 CM
BH CV XLRA - TDI S': 9.7 CM/SEC
SINUS: 2.6 CM

## 2024-11-18 PROCEDURE — A9500 TC99M SESTAMIBI: HCPCS | Performed by: INTERNAL MEDICINE

## 2024-11-18 PROCEDURE — 93017 CV STRESS TEST TRACING ONLY: CPT

## 2024-11-18 PROCEDURE — 25010000002 REGADENOSON 0.4 MG/5ML SOLUTION: Performed by: INTERNAL MEDICINE

## 2024-11-18 PROCEDURE — 78452 HT MUSCLE IMAGE SPECT MULT: CPT | Performed by: INTERNAL MEDICINE

## 2024-11-18 PROCEDURE — 34310000005 TECHNETIUM SESTAMIBI: Performed by: INTERNAL MEDICINE

## 2024-11-18 PROCEDURE — 93306 TTE W/DOPPLER COMPLETE: CPT

## 2024-11-18 PROCEDURE — 78452 HT MUSCLE IMAGE SPECT MULT: CPT

## 2024-11-18 PROCEDURE — 93018 CV STRESS TEST I&R ONLY: CPT | Performed by: INTERNAL MEDICINE

## 2024-11-18 RX ORDER — REGADENOSON 0.08 MG/ML
0.4 INJECTION, SOLUTION INTRAVENOUS
Status: COMPLETED | OUTPATIENT
Start: 2024-11-18 | End: 2024-11-18

## 2024-11-18 RX ADMIN — TECHNETIUM TC 99M SESTAMIBI 1 DOSE: 1 INJECTION INTRAVENOUS at 08:01

## 2024-11-18 RX ADMIN — TECHNETIUM TC 99M SESTAMIBI 1 DOSE: 1 INJECTION INTRAVENOUS at 09:44

## 2024-11-18 RX ADMIN — REGADENOSON 0.4 MG: 0.08 INJECTION, SOLUTION INTRAVENOUS at 09:44

## 2024-11-19 LAB
BH CV REST NUCLEAR ISOTOPE DOSE: 10 MCI
BH CV STRESS COMMENTS STAGE 1: NORMAL
BH CV STRESS DOSE REGADENOSON STAGE 1: 0.4
BH CV STRESS DURATION MIN STAGE 1: 0
BH CV STRESS DURATION SEC STAGE 1: 10
BH CV STRESS NUCLEAR ISOTOPE DOSE: 30 MCI
BH CV STRESS PROTOCOL 1: NORMAL
BH CV STRESS RECOVERY BP: NORMAL MMHG
BH CV STRESS RECOVERY HR: 83 BPM
BH CV STRESS STAGE 1: 1
MAXIMAL PREDICTED HEART RATE: 168 BPM
PERCENT MAX PREDICTED HR: 64.88 %
STRESS BASELINE BP: NORMAL MMHG
STRESS BASELINE HR: 71 BPM
STRESS PERCENT HR: 76 %
STRESS POST PEAK BP: NORMAL MMHG
STRESS POST PEAK HR: 109 BPM
STRESS TARGET HR: 143 BPM

## 2024-11-20 ENCOUNTER — TELEPHONE (OUTPATIENT)
Dept: CARDIOLOGY | Facility: CLINIC | Age: 54
End: 2024-11-20
Payer: MEDICARE

## 2024-11-20 NOTE — TELEPHONE ENCOUNTER
Called patient with results. She still has fatigue more than she feels is normal, shortness of breath and dizziness. She reports no chest pain. She would like to be seen sooner.     ----- Message from Shaun Valdovinos sent at 11/20/2024 10:21 AM EST -----  Routine follow-up, sooner for symptoms.  ----- Message -----  From: Bright Nayak MD  Sent: 11/19/2024   8:34 PM EST  To: HAMZAH Leon    Result Text  1.  No scintigraphic evidence of ischemia.  There is a very small, mild reversible defect confined to the anterolateral apical segment most compatible with chest wall attenuation artifact.     2.  Preserved post stress ejection fraction of 67% with no focal wall motion abnormalities.     3.  No findings suggestive of multivessel coronary disease or of increased LV filling pressures

## 2024-11-25 NOTE — TELEPHONE ENCOUNTER
Tried to call pt again at 5 pm went to . Pt has another appt we will just keep that at this time. Patient may call back in and if we have anything sooner we will get her scheduled during that call.

## 2024-11-25 NOTE — TELEPHONE ENCOUNTER
LVM STATING TRYING TO CONFIRM THE APPT. TOLD PT IF WE DO NOT HEAR FROM HER BY 4 PM WE WILL CANCEL THIS APPT.

## 2024-12-02 ENCOUNTER — TELEPHONE (OUTPATIENT)
Dept: CARDIOLOGY | Facility: CLINIC | Age: 54
End: 2024-12-02
Payer: MEDICARE

## 2024-12-02 NOTE — TELEPHONE ENCOUNTER
[facility]  ECHO  Called patient to notify of no acute findings or abnormalities. Keep follow up as scheduled. If you have any problem between now and then give our office a call.   ----- Message from Jocelyn WILSON sent at 12/2/2024  9:44 AM EST -----    ----- Message -----  From: Shaun Valdovinos PA  Sent: 12/1/2024  10:23 PM EST  To: Chichi Davidson MA    Routine follow-up.  ----- Message -----  From: Bright Nayak MD  Sent: 11/27/2024  12:49 PM EST  To: HAMZAH Leon